# Patient Record
Sex: MALE | Race: BLACK OR AFRICAN AMERICAN | URBAN - METROPOLITAN AREA
[De-identification: names, ages, dates, MRNs, and addresses within clinical notes are randomized per-mention and may not be internally consistent; named-entity substitution may affect disease eponyms.]

---

## 2020-08-10 NOTE — PROGRESS NOTES
[facility]  [address], [address]  Phone: [phone]  Fax: [phone]        INITIAL CONSULTATION      HISTORY OF PRESENT ILLNESS:  Bhanu Polanco is a 80 y.o. male whom is referred from Edwin Corrigan MD secondary to low back pain x 1 month without trauma. He rates his pain 2/10. He denies radicular symptoms. His pain is not exacerbated positionally. He has treated with Tylenol with benefit. Pt denies change in bowel or bladder habits. Patient denies previous spinal surgery, injections, or physical therapy/chiropractic care. PmHx of hypothyroidism. Note from Edwin Corrigan MD dated 6/30/2020 indicating patient has stage 3 renal disease, CVA, gout. He is known to have anemia and was referred to Dr. Renetta Nunez. He takes occasional tylenol for his back pain which has been intermittent for many years. Pt denies radicular symptoms. L spine XR dated 7/14/2020 films independently reviewed. Per report, decreased bone mineral density with multiple mild lumbar compression deformities which are of unknown chronicity. Multilevel degenerative disc disease and facet arthropathy with grade one degenerative anterior spondylolisthesis at L4-5. I independently reviewed the films and noted: anterior osteophytes noted throughout the Lumbar spine. The patient is RHD.  reviewed. There is no height or weight on file to calculate BMI. PCP: Edwin Corrigan MD    Past Medical History:   Diagnosis Date    Anemia         History reviewed. No pertinent surgical history. Social History     Tobacco Use    Smoking status: Never Smoker    Smokeless tobacco: Never Used   Substance Use Topics    Alcohol use: Never     Frequency: Never       Work status: N/A. Marital status: . No Known Allergies       History reviewed. No pertinent family history.       REVIEW OF SYSTEMS  Constitutional symptoms: Negative  Eyes: Negative  Ears, Nose, Throat, and Mouth: Negative  Cardiovascular: Negative  Respiratory: Negative  Genitourinary: Negative  Integumentary (Skin and/or breast): Negative  Musculoskeletal: Positive for low back pain. Extremities: 1+ edema distal BLE. Endocrine/Rheumatologic: Negative  Hematologic/Lymphatic: Negative  Allergic/Immunologic: Negative  Psychiatric: Negative       PHYSICAL EXAMINATION  Visit Vitals  /63 (BP 1 Location: Left arm, BP Patient Position: Sitting)   Pulse 68   Temp 98.3 °F (36.8 °C)   Resp 14   Wt 158 lb 9.6 oz (71.9 kg)   SpO2 99%       CONSTITUTIONAL: NAD, A&O x 3  HEART: Regular rate and rhythm  GASTROINTESTINAL: Positive bowel sounds, soft, nontender, and nondistended  LUNGS: Clear to auscultation bilaterally. SKIN: Negative for rash. RANGE OF MOTION: The patient has full passive range of motion in all four extremities. SENSATION: Sensation is intact to light touch throughout. MOTOR:   Straight Leg Raise: Negative, bilateral  [place]: Negative, bilateral  Deep tendon reflexes are 0 at the biceps, 2 at the triceps, and 0 at the brachioradialis bilaterally. Deep tendon reflexes are 0 on the RLE and 1 on the LLE at the knees and 0 at the ankles bilaterally. Ambulates with a single point cane. Shoulder AB/Flex Elbow Flex Wrist Ext Elbow Ext Wrist Flex Hand Intrin Tone   Right +4/5 +4/5 +4/5 +4/5 +4/5 +4/5 +4/5   Left +4/5 +4/5 +4/5 +4/5 +4/5 +4/5 +4/5              Hip Flex Knee Ext Knee Flex Ankle DF GTE Ankle PF Tone   Right +4/5 +4/5 +4/5 +4/5 +4/5 +4/5 +4/5   Left +4/5 +4/5 +4/5 +4/5 +4/5 +4/5 +4/5       ASSESSMENT   Diagnoses and all orders for this visit:    1. Lumbosacral spondylosis without myelopathy    2. DDD (degenerative disc disease), lumbar    3. Compression deformity of vertebra         IMPRESSIONS/RECOMMENDATIONS:  Patient presents today with c/o low back pain. Multiple treatment options were discussed. It is not clear to me based on his XR if his lumbar compression deformities are acute or not.  I discussed with the pt the natural healing course of these types of injuries. We discussed that an MRI would provided additional information. Pt is not interested in proceeding with an MRI. If his fractures are acute, pt is not interested in proceeding with injections or PT at this time. Pt is not interested in surgical intervention or kyphoplasty. The pt will continue to use extra-strength tylenol on a regular bases to help with his pain complaints. Patient is neurologically intact. I will see the patient back in 1 month's time or earlier if needed. Written by Myra Gutiérrez, as dictated by Ilda Cueva MD  I examined the patient, reviewed and agree with the note.

## 2020-08-12 ENCOUNTER — OFFICE VISIT (OUTPATIENT)
Dept: ORTHOPEDIC SURGERY | Age: 85
End: 2020-08-12

## 2020-08-12 VITALS
HEART RATE: 68 BPM | WEIGHT: 158.6 LBS | OXYGEN SATURATION: 99 % | RESPIRATION RATE: 14 BRPM | BODY MASS INDEX: 22.71 KG/M2 | DIASTOLIC BLOOD PRESSURE: 63 MMHG | TEMPERATURE: 98.3 F | SYSTOLIC BLOOD PRESSURE: 112 MMHG | HEIGHT: 70 IN

## 2020-08-12 DIAGNOSIS — M47.817 LUMBOSACRAL SPONDYLOSIS WITHOUT MYELOPATHY: Primary | ICD-10-CM

## 2020-08-12 DIAGNOSIS — M43.9 COMPRESSION DEFORMITY OF VERTEBRA: ICD-10-CM

## 2020-08-12 DIAGNOSIS — M51.36 DDD (DEGENERATIVE DISC DISEASE), LUMBAR: ICD-10-CM

## 2020-08-12 DIAGNOSIS — M43.10 SPONDYLOLISTHESIS, ACQUIRED: ICD-10-CM

## 2020-08-12 RX ORDER — FOLIC ACID 1 MG/1
TABLET ORAL
COMMUNITY
Start: 2020-07-16

## 2020-08-12 RX ORDER — LEVOTHYROXINE SODIUM 50 UG/1
TABLET ORAL
COMMUNITY
Start: 2020-07-04

## 2020-08-12 RX ORDER — ALLOPURINOL 100 MG/1
100 TABLET ORAL DAILY
COMMUNITY

## 2020-08-12 RX ORDER — FERROUS GLUCONATE 240(27)MG
TABLET ORAL
COMMUNITY
Start: 2020-07-16

## 2020-08-12 RX ORDER — TAMSULOSIN HYDROCHLORIDE 0.4 MG/1
CAPSULE ORAL
COMMUNITY
Start: 2020-07-04

## 2020-08-12 NOTE — LETTER
8/12/20 Patient: Sary Bagley YOB: 1922 Date of Visit: 8/12/2020 Yandel Atkinson MD 
[address] [address] VIA Facsimile: [phone] Dear Yandel Atkinson MD, Thank you for referring Mr. Sary Bagley to [address] for evaluation. My notes for this consultation are attached. If you have questions, please do not hesitate to call me. I look forward to following your patient along with you. Sincerely, Tennille Barrera MD